# Patient Record
Sex: FEMALE | Employment: OTHER | URBAN - METROPOLITAN AREA
[De-identification: names, ages, dates, MRNs, and addresses within clinical notes are randomized per-mention and may not be internally consistent; named-entity substitution may affect disease eponyms.]

---

## 2021-07-22 ENCOUNTER — HOSPITAL ENCOUNTER (OUTPATIENT)
Age: 66
Setting detail: SPECIMEN
Discharge: HOME | End: 2021-07-22
Payer: MEDICARE

## 2021-07-22 PROCEDURE — U0003 INFECTIOUS AGENT DETECTION BY NUCLEIC ACID (DNA OR RNA); SEVERE ACUTE RESPIRATORY SYNDROME CORONAVIRUS 2 (SARS-COV-2) (CORONAVIRUS DISEASE [COVID-19]), AMPLIFIED PROBE TECHNIQUE, MAKING USE OF HIGH THROUGHPUT TECHNOLOGIES AS DESCRIBED BY CMS-2020-01-R: HCPCS

## 2021-07-22 PROCEDURE — U0005 INFEC AGEN DETEC AMPLI PROBE: HCPCS

## 2021-07-23 LAB
SARS-COV-2: NOT DETECTED
SOURCE: NORMAL

## 2022-03-28 ENCOUNTER — TELEPHONE (OUTPATIENT)
Dept: CARDIOLOGY CLINIC | Age: 67
End: 2022-03-28

## 2022-03-28 NOTE — TELEPHONE ENCOUNTER
Left message for patient requesting a return call to schedule a consult for peripheral vascular disease and essential hypertension per referral from Hattie Macedo.

## 2022-05-25 ENCOUNTER — HOSPITAL ENCOUNTER (OUTPATIENT)
Dept: CT IMAGING | Age: 67
Discharge: HOME | End: 2022-05-25
Payer: MEDICARE

## 2022-05-25 ENCOUNTER — INITIAL CONSULT (OUTPATIENT)
Dept: CARDIOLOGY CLINIC | Age: 67
End: 2022-05-25
Payer: MEDICARE

## 2022-05-25 VITALS
WEIGHT: 272 LBS | HEART RATE: 69 BPM | HEIGHT: 72 IN | DIASTOLIC BLOOD PRESSURE: 80 MMHG | BODY MASS INDEX: 36.84 KG/M2 | SYSTOLIC BLOOD PRESSURE: 110 MMHG

## 2022-05-25 DIAGNOSIS — E78.5 HYPERLIPIDEMIA, UNSPECIFIED HYPERLIPIDEMIA TYPE: ICD-10-CM

## 2022-05-25 DIAGNOSIS — I10 HYPERTENSION, UNSPECIFIED TYPE: Primary | ICD-10-CM

## 2022-05-25 DIAGNOSIS — R06.02 SOB (SHORTNESS OF BREATH): ICD-10-CM

## 2022-05-25 DIAGNOSIS — I73.9 CLAUDICATION (HCC): ICD-10-CM

## 2022-05-25 DIAGNOSIS — I10 HYPERTENSION, UNSPECIFIED TYPE: ICD-10-CM

## 2022-05-25 LAB
GFR AFRICAN AMERICAN: 54 ML/MIN/1.73M2
GFR NON-AFRICAN AMERICAN: 45 ML/MIN/1.73M2
POC CREATININE: 1.2 MG/DL (ref 0.6–1.1)

## 2022-05-25 PROCEDURE — 75635 CT ANGIO ABDOMINAL ARTERIES: CPT

## 2022-05-25 PROCEDURE — 1036F TOBACCO NON-USER: CPT | Performed by: INTERNAL MEDICINE

## 2022-05-25 PROCEDURE — 2580000003 HC RX 258: Performed by: INTERNAL MEDICINE

## 2022-05-25 PROCEDURE — 93000 ELECTROCARDIOGRAM COMPLETE: CPT | Performed by: INTERNAL MEDICINE

## 2022-05-25 PROCEDURE — 3017F COLORECTAL CA SCREEN DOC REV: CPT | Performed by: INTERNAL MEDICINE

## 2022-05-25 PROCEDURE — 1090F PRES/ABSN URINE INCON ASSESS: CPT | Performed by: INTERNAL MEDICINE

## 2022-05-25 PROCEDURE — 1123F ACP DISCUSS/DSCN MKR DOCD: CPT | Performed by: INTERNAL MEDICINE

## 2022-05-25 PROCEDURE — G8400 PT W/DXA NO RESULTS DOC: HCPCS | Performed by: INTERNAL MEDICINE

## 2022-05-25 PROCEDURE — G8417 CALC BMI ABV UP PARAM F/U: HCPCS | Performed by: INTERNAL MEDICINE

## 2022-05-25 PROCEDURE — 99204 OFFICE O/P NEW MOD 45 MIN: CPT | Performed by: INTERNAL MEDICINE

## 2022-05-25 PROCEDURE — 6360000004 HC RX CONTRAST MEDICATION: Performed by: INTERNAL MEDICINE

## 2022-05-25 PROCEDURE — G8427 DOCREV CUR MEDS BY ELIG CLIN: HCPCS | Performed by: INTERNAL MEDICINE

## 2022-05-25 RX ORDER — LEVOTHYROXINE SODIUM 0.05 MG/1
50 TABLET ORAL DAILY
COMMUNITY
Start: 2022-03-22

## 2022-05-25 RX ORDER — DICYCLOMINE HCL 20 MG
20 TABLET ORAL EVERY 6 HOURS
COMMUNITY
Start: 2022-03-22

## 2022-05-25 RX ORDER — LISINOPRIL 10 MG/1
10 TABLET ORAL DAILY
COMMUNITY
Start: 2022-03-22

## 2022-05-25 RX ORDER — ALBUTEROL SULFATE 90 UG/1
1 AEROSOL, METERED RESPIRATORY (INHALATION) EVERY 6 HOURS PRN
COMMUNITY

## 2022-05-25 RX ORDER — ROSUVASTATIN CALCIUM 40 MG/1
40 TABLET, COATED ORAL DAILY
COMMUNITY
Start: 2022-03-22

## 2022-05-25 RX ORDER — CLOPIDOGREL BISULFATE 75 MG/1
75 TABLET ORAL DAILY
Qty: 90 TABLET | Refills: 3 | Status: SHIPPED | OUTPATIENT
Start: 2022-05-25

## 2022-05-25 RX ORDER — SODIUM CHLORIDE 0.9 % (FLUSH) 0.9 %
5-40 SYRINGE (ML) INJECTION PRN
Status: DISCONTINUED | OUTPATIENT
Start: 2022-05-25 | End: 2022-05-26 | Stop reason: HOSPADM

## 2022-05-25 RX ORDER — LORAZEPAM 1 MG/1
0.5 TABLET ORAL PRN
COMMUNITY
Start: 2022-05-05

## 2022-05-25 RX ORDER — OXYCODONE AND ACETAMINOPHEN 7.5; 325 MG/1; MG/1
7.5 TABLET ORAL EVERY 8 HOURS PRN
COMMUNITY
Start: 2022-04-29

## 2022-05-25 RX ORDER — CLOPIDOGREL BISULFATE 75 MG/1
75 TABLET ORAL DAILY
COMMUNITY
Start: 2022-03-22 | End: 2022-05-25 | Stop reason: SDUPTHER

## 2022-05-25 RX ADMIN — IOPAMIDOL 95 ML: 755 INJECTION, SOLUTION INTRAVENOUS at 15:40

## 2022-05-25 RX ADMIN — SODIUM CHLORIDE, PRESERVATIVE FREE 10 ML: 5 INJECTION INTRAVENOUS at 15:40

## 2022-05-25 NOTE — PROGRESS NOTES
(123.4 kg)   Height: 6' 1\" (1.854 m)     /80   Pulse 69   Ht 6' 1\" (1.854 m)   Wt 272 lb (123.4 kg)   BMI 35.89 kg/m²     No flowsheet data found. Wt Readings from Last 3 Encounters:   05/25/22 272 lb (123.4 kg)     Body mass index is 35.89 kg/m². GENERAL - Alert, oriented, pleasant, in no apparent distress. EYES: No jaundice, no conjunctival pallor. SKIN: It is warm & dry. No rashes. No Echhymosis    HEENT  No clinically significant abnormalities seen. Neck - Supple. No jugular venous distention noted. No carotid bruits. Cardiovascular  Normal S1 and S2 without obvious murmur or gallop. Extremities - No cyanosis, clubbing, or significant edema. Pulmonary  No respiratory distress. No wheezes or rales. Abdomen  No masses, tenderness, or organomegaly. Musculoskeletal  No significant edema. No joint deformities. No muscle wasting. Neurologic  Cranial nerves II through XII are grossly intact. There were no gross focal neurologic abnormalities. Lab Review   No results found for: CKTOTAL, CKMB, CKMBINDEX, TROPONINT  BNP:  No results found for: BNP  PT/INR:  No results found for: INR  No results found for: LABA1C  No results found for: WBC, HCT, MCV, PLT  No results found for: CHOL, TRIG, HDL, LDLCALC, LDLDIRECT, CHOLHDLRATIO  No results found for: ALT, AST  BMP:  No results found for: NA, K, CL, CO2, BUN, CREATININE, GLU  CMP: No results found for: NA, K, CL, CO2, BUN, GLU, PROT, ALB, CA  TSH:  No results found for: TSH, TSHHS        Assessment & Plan:    -  Hypertension: Patients blood pressure is normal. Patient is advised about low sodium diet. Present medical regimen will not be changed. On lisinopril blood pressure is well controlled we will continue          -  LIPID MANAGEMENT:  Importance of lipid levels discussed with patient   and patient was given dietary advice. NCEP- ATP III guidelines reviewed with patient.     -   Changes  in medicines made: No     On Crestor 40 mg p.o. daily we will check the lipid profile    -On Synthroid for hypothyroidism on 50 mcg p.o. daily                    -Peripheral vascular disease  . Monophasic waveforms bilaterally throughout the lower extremities,   suggesting inflow disease (aorta-iliac disease). 2. Segment distal superficial femoral artery on the right with approximately   50-70 percent stenosis. ABIs done 3/27/19 that show a right JORDAN of 0.78 and a left JORDAN of 0.82. CTA abd and runoff    Check echo for EF      - DVT: in past in LLE ? Etiology    Mortality from the morbid obesity is very high: Body mass index is 35.89 kg/m². Meir Hayes MD    [facility] [place]    Please note this report has been partially produced using speech recognition software and may contain errors related to that system including errors in grammar, punctuation, and spelling, as well as words and phrases that may be inappropriate. If there are any questions or concerns please feel free to contact the dictating provider for clarification.

## 2022-05-26 ENCOUNTER — PROCEDURE VISIT (OUTPATIENT)
Dept: CARDIOLOGY CLINIC | Age: 67
End: 2022-05-26
Payer: MEDICARE

## 2022-05-26 ENCOUNTER — TELEPHONE (OUTPATIENT)
Dept: CARDIOLOGY CLINIC | Age: 67
End: 2022-05-26

## 2022-05-26 DIAGNOSIS — I10 HYPERTENSION, UNSPECIFIED TYPE: ICD-10-CM

## 2022-05-26 DIAGNOSIS — I73.9 CLAUDICATION (HCC): ICD-10-CM

## 2022-05-26 DIAGNOSIS — R06.02 SOB (SHORTNESS OF BREATH): Primary | ICD-10-CM

## 2022-05-26 DIAGNOSIS — E78.5 HYPERLIPIDEMIA, UNSPECIFIED HYPERLIPIDEMIA TYPE: ICD-10-CM

## 2022-05-26 LAB
LV EF: 58 %
LVEF MODALITY: NORMAL

## 2022-05-26 PROCEDURE — 93306 TTE W/DOPPLER COMPLETE: CPT | Performed by: INTERNAL MEDICINE

## 2022-05-26 NOTE — TELEPHONE ENCOUNTER
Left message on patient's voicemail to call office to discuss date and time for peripheral angiogram..

## 2022-05-26 NOTE — TELEPHONE ENCOUNTER
I spoke with patient and scheduled peripheral angiogram for 5/31/22 @ 8:00 with arrival time of 6:00.   She will come for paperwpork pm [phone] @ 11:00

## 2022-05-27 ENCOUNTER — HOSPITAL ENCOUNTER (OUTPATIENT)
Age: 67
Discharge: HOME | End: 2022-05-27
Payer: MEDICARE

## 2022-05-27 ENCOUNTER — NURSE ONLY (OUTPATIENT)
Dept: CARDIOLOGY CLINIC | Age: 67
End: 2022-05-27
Payer: MEDICARE

## 2022-05-27 DIAGNOSIS — Z01.810 PRE-OPERATIVE CARDIOVASCULAR EXAMINATION: ICD-10-CM

## 2022-05-27 LAB
ANION GAP SERPL CALCULATED.3IONS-SCNC: 13 MMOL/L (ref 4–16)
BASOPHILS ABSOLUTE: 0 K/CU MM
BASOPHILS RELATIVE PERCENT: 0.4 % (ref 0–1)
BUN BLDV-MCNC: 12 MG/DL (ref 6–23)
CALCIUM SERPL-MCNC: 9.3 MG/DL (ref 8.3–10.6)
CHLORIDE BLD-SCNC: 106 MMOL/L (ref 99–110)
CO2: 23 MMOL/L (ref 21–32)
CREAT SERPL-MCNC: 0.8 MG/DL (ref 0.6–1.1)
DIFFERENTIAL TYPE: ABNORMAL
EOSINOPHILS ABSOLUTE: 0.2 K/CU MM
EOSINOPHILS RELATIVE PERCENT: 3.6 % (ref 0–3)
GFR AFRICAN AMERICAN: >60 ML/MIN/1.73M2
GFR NON-AFRICAN AMERICAN: >60 ML/MIN/1.73M2
GLUCOSE BLD-MCNC: 99 MG/DL (ref 70–99)
HCT VFR BLD CALC: 43 % (ref 37–47)
HEMOGLOBIN: 13.3 GM/DL (ref 12.5–16)
IMMATURE NEUTROPHIL %: 0.2 % (ref 0–0.43)
LYMPHOCYTES ABSOLUTE: 1.3 K/CU MM
LYMPHOCYTES RELATIVE PERCENT: 23.9 % (ref 24–44)
MCH RBC QN AUTO: 29 PG (ref 27–31)
MCHC RBC AUTO-ENTMCNC: 30.9 % (ref 32–36)
MCV RBC AUTO: 93.9 FL (ref 78–100)
MONOCYTES ABSOLUTE: 0.5 K/CU MM
MONOCYTES RELATIVE PERCENT: 8.8 % (ref 0–4)
NUCLEATED RBC %: 0 %
PDW BLD-RTO: 13.5 % (ref 11.7–14.9)
PLATELET # BLD: 253 K/CU MM (ref 140–440)
PMV BLD AUTO: 9.3 FL (ref 7.5–11.1)
POTASSIUM SERPL-SCNC: 4.8 MMOL/L (ref 3.5–5.1)
RBC # BLD: 4.58 M/CU MM (ref 4.2–5.4)
SEGMENTED NEUTROPHILS ABSOLUTE COUNT: 3.4 K/CU MM
SEGMENTED NEUTROPHILS RELATIVE PERCENT: 63.1 % (ref 36–66)
SODIUM BLD-SCNC: 142 MMOL/L (ref 135–145)
TOTAL IMMATURE NEUTOROPHIL: 0.01 K/CU MM
TOTAL NUCLEATED RBC: 0 K/CU MM
WBC # BLD: 5.3 K/CU MM (ref 4–10.5)

## 2022-05-27 PROCEDURE — 80048 BASIC METABOLIC PNL TOTAL CA: CPT

## 2022-05-27 PROCEDURE — 36415 COLL VENOUS BLD VENIPUNCTURE: CPT

## 2022-05-27 PROCEDURE — 99211 OFF/OP EST MAY X REQ PHY/QHP: CPT | Performed by: INTERNAL MEDICINE

## 2022-05-27 PROCEDURE — 85025 COMPLETE CBC W/AUTO DIFF WBC: CPT

## 2022-05-27 NOTE — PROGRESS NOTES
Patient was here in office & educated on Peripheral Angiogram for Dx: PAD. Procedure is scheduled for 5/31/22  @ 8:00, w/arrival @ 6:00, @ [facility]. Pre-admission orders were given to patient for labs & CXR, which are due 5/27/22 @ [facility]. Procedure and risks were explained to patient. Consent forms were signed. Instructions were given to patient to remain NPO after midnight the night before procedure. Patient may take morning meds the morning of procedure with small amount of water. Patient is asked to call hospital @ [phone], 1 to 2 days before procedure to pre-register. Patient was notified that procedure could be delayed due to an emergency. Patient voiced understanding.  Copies of consent, pre-testing orders, & information sheet scanned into media

## 2022-05-31 ENCOUNTER — HOSPITAL ENCOUNTER (OUTPATIENT)
Dept: CARDIAC CATH/INVASIVE PROCEDURES | Age: 67
Discharge: HOME | End: 2022-05-31
Attending: INTERNAL MEDICINE | Admitting: INTERNAL MEDICINE
Payer: MEDICARE

## 2022-05-31 VITALS
WEIGHT: 274 LBS | DIASTOLIC BLOOD PRESSURE: 60 MMHG | TEMPERATURE: 95.2 F | HEART RATE: 71 BPM | BODY MASS INDEX: 37.11 KG/M2 | RESPIRATION RATE: 14 BRPM | HEIGHT: 72 IN | SYSTOLIC BLOOD PRESSURE: 131 MMHG | OXYGEN SATURATION: 97 %

## 2022-05-31 PROCEDURE — 2580000003 HC RX 258: Performed by: INTERNAL MEDICINE

## 2022-05-31 PROCEDURE — 6370000000 HC RX 637 (ALT 250 FOR IP): Performed by: INTERNAL MEDICINE

## 2022-05-31 PROCEDURE — 6360000004 HC RX CONTRAST MEDICATION

## 2022-05-31 PROCEDURE — 75716 ARTERY X-RAYS ARMS/LEGS: CPT | Performed by: INTERNAL MEDICINE

## 2022-05-31 PROCEDURE — 75630 X-RAY AORTA LEG ARTERIES: CPT

## 2022-05-31 PROCEDURE — 2709999900 HC NON-CHARGEABLE SUPPLY

## 2022-05-31 PROCEDURE — C1894 INTRO/SHEATH, NON-LASER: HCPCS

## 2022-05-31 PROCEDURE — 36200 PLACE CATHETER IN AORTA: CPT | Performed by: INTERNAL MEDICINE

## 2022-05-31 PROCEDURE — 36200 PLACE CATHETER IN AORTA: CPT

## 2022-05-31 PROCEDURE — 75625 CONTRAST EXAM ABDOMINL AORTA: CPT | Performed by: INTERNAL MEDICINE

## 2022-05-31 PROCEDURE — 6360000002 HC RX W HCPCS

## 2022-05-31 RX ORDER — IBUPROFEN 600 MG/1
600 TABLET ORAL EVERY 6 HOURS PRN
COMMUNITY

## 2022-05-31 RX ORDER — CILOSTAZOL 50 MG/1
50 TABLET ORAL 2 TIMES DAILY
Qty: 60 TABLET | Refills: 3 | Status: SHIPPED | OUTPATIENT
Start: 2022-05-31 | End: 2022-06-13 | Stop reason: DRUGHIGH

## 2022-05-31 RX ORDER — SODIUM CHLORIDE 9 MG/ML
INJECTION, SOLUTION INTRAVENOUS PRN
Status: DISCONTINUED | OUTPATIENT
Start: 2022-05-31 | End: 2022-05-31 | Stop reason: HOSPADM

## 2022-05-31 RX ORDER — CETIRIZINE HYDROCHLORIDE 10 MG/1
10 TABLET ORAL DAILY
COMMUNITY

## 2022-05-31 RX ORDER — DIAZEPAM 5 MG/1
5 TABLET ORAL ONCE
Status: COMPLETED | OUTPATIENT
Start: 2022-05-31 | End: 2022-05-31

## 2022-05-31 RX ORDER — DIPHENHYDRAMINE HCL 25 MG
25 TABLET ORAL ONCE
Status: COMPLETED | OUTPATIENT
Start: 2022-05-31 | End: 2022-05-31

## 2022-05-31 RX ORDER — SODIUM CHLORIDE 0.9 % (FLUSH) 0.9 %
5-40 SYRINGE (ML) INJECTION EVERY 12 HOURS SCHEDULED
Status: DISCONTINUED | OUTPATIENT
Start: 2022-05-31 | End: 2022-05-31 | Stop reason: HOSPADM

## 2022-05-31 RX ORDER — SODIUM CHLORIDE 9 MG/ML
INJECTION, SOLUTION INTRAVENOUS CONTINUOUS
Status: DISCONTINUED | OUTPATIENT
Start: 2022-05-31 | End: 2022-05-31 | Stop reason: HOSPADM

## 2022-05-31 RX ORDER — SODIUM CHLORIDE 0.9 % (FLUSH) 0.9 %
5-40 SYRINGE (ML) INJECTION PRN
Status: DISCONTINUED | OUTPATIENT
Start: 2022-05-31 | End: 2022-05-31 | Stop reason: HOSPADM

## 2022-05-31 RX ORDER — ACETAMINOPHEN 325 MG/1
650 TABLET ORAL EVERY 4 HOURS PRN
Status: DISCONTINUED | OUTPATIENT
Start: 2022-05-31 | End: 2022-05-31 | Stop reason: HOSPADM

## 2022-05-31 RX ADMIN — DIPHENHYDRAMINE HCL 25 MG: 25 TABLET ORAL at 07:23

## 2022-05-31 RX ADMIN — SODIUM CHLORIDE: 9 INJECTION, SOLUTION INTRAVENOUS at 07:23

## 2022-05-31 RX ADMIN — DIAZEPAM 5 MG: 5 TABLET ORAL at 07:23

## 2022-05-31 NOTE — PLAN OF CARE
Sat patient up 30 degrees at this time to prepare for discharge. Patient denies needs and left groin assessed with no bleeding or hematoma noted. Called patients pharmacy in [place] in regards to obtaining the pletal prescription any cheaper due to patient utilizing the RX card. Hospital faxed the prescription to [facility] pharmacy to fill there and save money. All discharge instructions were explained to the patient and  while at bedside and patient denies any additional information. Patient walked to the bathroom without difficulty and IV removed per orders.   [address] 5/31/2022

## 2022-05-31 NOTE — H&P
CHIEF COMPLAINT   Melquiades Boyd is a 78 y.o. female who was seen today for management of peripheral vascular disease                                    HPI:                    Pt has h/o peripheral vascular disease, had PTA of RLE, in past, DVT LLE , hypertension , hyperlipidemia seen today for  joby LE pain and numbness, RLE more painful. Pt saw MD at [address] [place] [address] has the following history recorded in care path: There are no problems to display for this patient.     Current Facility-Administered Medications          Current Outpatient Medications   Medication Sig Dispense Refill    oxyCODONE-acetaminophen (PERCOCET) 7.5-325 MG per tablet Take 7.5 tablets by mouth every 8 hours as needed.        LORazepam (ATIVAN) 1 MG tablet Take 0.5 mg by mouth as needed.        albuterol sulfate  (90 Base) MCG/ACT inhaler Inhale 1 puff into the lungs every 6 hours as needed        rosuvastatin (CRESTOR) 40 MG tablet Take 40 mg by mouth daily        lisinopril (PRINIVIL;ZESTRIL) 10 MG tablet Take 10 mg by mouth daily        clopidogrel (PLAVIX) 75 MG tablet Take 75 mg by mouth daily        levothyroxine (SYNTHROID) 50 MCG tablet Take 50 mcg by mouth daily        dicyclomine (BENTYL) 20 MG tablet Take 20 mg by mouth every 6 hours          No current facility-administered medications for this visit.         Allergies: Patient has no known allergies. Past Medical History   History reviewed. No pertinent past medical history. Past Surgical History   History reviewed. No pertinent surgical history.       As reviewed   Family History   Family History   Problem Relation Age of Onset    Heart Attack Mother           Social History            Tobacco Use    Smoking status: Former Smoker       Types: Cigarettes    Smokeless tobacco: Never Used   Substance Use Topics    Alcohol use: Not Currently         Objective:     Vitals       Vitals:     05/25/22 1428   BP: 110/80   Pulse: 69   Weight: 272 lb (123.4 kg) Height: 6' 1\" (1.854 m)         /80   Pulse 69   Ht 6' 1\" (1.854 m)   Wt 272 lb (123.4 kg)   BMI 35.89 kg/m²      No flowsheet data found.          Wt Readings from Last 3 Encounters:   05/25/22 272 lb (123.4 kg)      Body mass index is 35.89 kg/m². GENERAL - Alert, oriented, pleasant, in no apparent distress. EYES: No jaundice, no conjunctival pallor. SKIN: It is warm & dry. No rashes. No Echhymosis    HEENT  No clinically significant abnormalities seen. Neck - Supple. No jugular venous distention noted. No carotid bruits. Cardiovascular  Normal S1 and S2 without obvious murmur or gallop. Extremities - No cyanosis, clubbing, or significant edema. Pulmonary  No respiratory distress. No wheezes or rales. Abdomen  No masses, tenderness, or organomegaly. Musculoskeletal  No significant edema. No joint deformities. No muscle wasting. Neurologic  Cranial nerves II through XII are grossly intact. There were no gross focal neurologic abnormalities.     Lab Review   No results found for: CKTOTAL, CKMB, CKMBINDEX, TROPONINT  BNP:  No results found for: BNP  PT/INR:  No results found for: INR  No results found for: LABA1C  No results found for: WBC, HCT, MCV, PLT  No results found for: CHOL, TRIG, HDL, LDLCALC, LDLDIRECT, CHOLHDLRATIO  No results found for: ALT, AST  BMP:  No results found for: NA, K, CL, CO2, BUN, CREATININE, GLU  CMP: No results found for: NA, K, CL, CO2, BUN, GLU, PROT, ALB, CA  TSH:  No results found for: TSH, TSHHS           Assessment & Plan:     -  Hypertension: Patients blood pressure is normal. Patient is advised about low sodium diet. Present medical regimen will not be changed.          On lisinopril blood pressure is well controlled we will continue           -  LIPID MANAGEMENT:  Importance of lipid levels discussed with patient   and patient was given dietary advice. NCEP- ATP III guidelines reviewed with patient.     -   Changes  in medicines made: No     On Crestor 40 mg p.o. daily we will check the lipid profile     -On Synthroid for hypothyroidism on 50 mcg p.o. daily                    -Peripheral vascular disease  . Monophasic waveforms bilaterally throughout the lower extremities,   suggesting inflow disease (aorta-iliac disease). 2. Segment distal superficial femoral artery on the right with approximately   50-70 percent stenosis.      ABIs done 3/27/19 that show a right JORDAN of 0.78 and a left JORDAN of 0.82.      CTA abd and runoff     Check echo for EF        - DVT: in past in LLE ?  Etiology     Mortality from the morbid obesity is very high:     Body mass index is 35.89 kg/m

## 2022-06-01 ENCOUNTER — TELEPHONE (OUTPATIENT)
Dept: CARDIOLOGY CLINIC | Age: 67
End: 2022-06-01

## 2022-06-01 DIAGNOSIS — R06.02 SOB (SHORTNESS OF BREATH): Primary | ICD-10-CM

## 2022-06-01 DIAGNOSIS — I73.9 CLAUDICATION (HCC): ICD-10-CM

## 2022-06-01 NOTE — TELEPHONE ENCOUNTER
Attempted to call patient no answer. Left message advised her that we need to schedule lexiscan per . Ask for [place] Filter when returning call.      Schedule for pre EVAR  lexiscan   High risk for CAD

## 2022-06-02 NOTE — OP NOTE
Procedure  Abdominal aortogram  Distal runoff  Hemodynamic assessment of the abdominal aorta    Procedure  After CONSENT PATIENT WAS BROUGHT TO THE CATH LAB THE LEFT GROIN WAS INJECTED 2% LIDOCAINE A 4 Khmer SHEATH WAS PLACED IN THE LEFT FEMORAL ARTERY WAS TAKING UP OBESITY DIFFICULT WAS PLACED IN THE DESCENDING AORTA AND ABDOMINAL REBOUND PERFORMED AND THE CATH WAS PULLED BACK ABOVE THE BIFURCATION AND DISTAL RUNOFF PERFORMED PATIENT CLINICALLY DOING STABLE    FINDINGS    The abdominal aorta is atherosclerotic its aneurysmal in the middle there appears to be a stenosis infrarenally and a 20 mm gradient was noticed    Right common iliac artery has ostial stenosis which is about 70%  The right external iliac artery and right common femoral artery is patent  The right SFA has some diffuse disease distally there is a stent which is just approaching the popliteal and the appropriate there appears to be 50% stenosis and infrapopliteal there is three-vessel runoff without any significant stenosis    Left common iliac arteries patent left external leg arteries patent left common femoral artery is patent the left SFA is patent the left popliteal has about 80 to 90% stenosis in its mid segment infrapopliteal there is three-vessel runoff    Assessment and plan  Patient is severe PVD as mentioned above the abdominal aorta is atherosclerotic and appears to be aneurysmal hence we will obtain a dedicated CTA abdomen to evaluate the size of the aneurysm and as mentioned there is a stenosis in the mid segment of the aorta i.e. abdominal aorta  The right common iliac artery has ostial stenosis which is appears to be significant about 80%  The right SFA distal to the stent has about 50 to 60% stenosis  On the left side the left popliteal artery has critical stenosis    After we obtain the CT of the abdomen and assess the size of the aneurysm which will decide whether the patient will need EVAR for management  The distal SFA and the popliteal on the left side can be approached daily if needed however first we need to assess the aneurysmal size the CT has been arranged for next week

## 2022-06-03 ENCOUNTER — PROCEDURE VISIT (OUTPATIENT)
Dept: CARDIOLOGY CLINIC | Age: 67
End: 2022-06-03
Payer: MEDICARE

## 2022-06-03 DIAGNOSIS — R06.02 SOB (SHORTNESS OF BREATH): ICD-10-CM

## 2022-06-03 DIAGNOSIS — I73.9 CLAUDICATION (HCC): ICD-10-CM

## 2022-06-03 LAB
LV EF: 60 %
LVEF MODALITY: NORMAL

## 2022-06-03 PROCEDURE — 93018 CV STRESS TEST I&R ONLY: CPT | Performed by: INTERNAL MEDICINE

## 2022-06-03 PROCEDURE — 78452 HT MUSCLE IMAGE SPECT MULT: CPT | Performed by: INTERNAL MEDICINE

## 2022-06-03 PROCEDURE — A9500 TC99M SESTAMIBI: HCPCS | Performed by: INTERNAL MEDICINE

## 2022-06-03 PROCEDURE — 93016 CV STRESS TEST SUPVJ ONLY: CPT | Performed by: INTERNAL MEDICINE

## 2022-06-03 PROCEDURE — 93017 CV STRESS TEST TRACING ONLY: CPT | Performed by: INTERNAL MEDICINE

## 2022-06-07 ENCOUNTER — HOSPITAL ENCOUNTER (OUTPATIENT)
Dept: CT IMAGING | Age: 67
Discharge: HOME | End: 2022-06-07
Payer: MEDICARE

## 2022-06-07 DIAGNOSIS — I71.40 ABDOMINAL AORTIC ANEURYSM (AAA) WITHOUT RUPTURE: ICD-10-CM

## 2022-06-07 PROCEDURE — 74174 CTA ABD&PLVS W/CONTRAST: CPT

## 2022-06-07 PROCEDURE — 6360000004 HC RX CONTRAST MEDICATION: Performed by: INTERNAL MEDICINE

## 2022-06-07 RX ADMIN — IOPAMIDOL 75 ML: 755 INJECTION, SOLUTION INTRAVENOUS at 14:10

## 2022-06-13 ENCOUNTER — OFFICE VISIT (OUTPATIENT)
Dept: CARDIOLOGY CLINIC | Age: 67
End: 2022-06-13
Payer: MEDICARE

## 2022-06-13 VITALS
BODY MASS INDEX: 36.35 KG/M2 | DIASTOLIC BLOOD PRESSURE: 72 MMHG | HEART RATE: 80 BPM | HEIGHT: 72 IN | SYSTOLIC BLOOD PRESSURE: 118 MMHG | WEIGHT: 268.4 LBS

## 2022-06-13 DIAGNOSIS — I73.9 CLAUDICATION (HCC): Primary | ICD-10-CM

## 2022-06-13 PROCEDURE — G8427 DOCREV CUR MEDS BY ELIG CLIN: HCPCS | Performed by: INTERNAL MEDICINE

## 2022-06-13 PROCEDURE — 1036F TOBACCO NON-USER: CPT | Performed by: INTERNAL MEDICINE

## 2022-06-13 PROCEDURE — G8417 CALC BMI ABV UP PARAM F/U: HCPCS | Performed by: INTERNAL MEDICINE

## 2022-06-13 PROCEDURE — 1090F PRES/ABSN URINE INCON ASSESS: CPT | Performed by: INTERNAL MEDICINE

## 2022-06-13 PROCEDURE — 1123F ACP DISCUSS/DSCN MKR DOCD: CPT | Performed by: INTERNAL MEDICINE

## 2022-06-13 PROCEDURE — G8400 PT W/DXA NO RESULTS DOC: HCPCS | Performed by: INTERNAL MEDICINE

## 2022-06-13 PROCEDURE — 99214 OFFICE O/P EST MOD 30 MIN: CPT | Performed by: INTERNAL MEDICINE

## 2022-06-13 PROCEDURE — 3017F COLORECTAL CA SCREEN DOC REV: CPT | Performed by: INTERNAL MEDICINE

## 2022-06-13 RX ORDER — CILOSTAZOL 50 MG/1
100 TABLET ORAL 2 TIMES DAILY
Qty: 60 TABLET | Refills: 3 | Status: SHIPPED
Start: 2022-06-13 | End: 2022-09-20

## 2022-06-13 RX ORDER — BUSPIRONE HYDROCHLORIDE 10 MG/1
TABLET ORAL
COMMUNITY
Start: 2022-06-08

## 2022-06-13 NOTE — PROGRESS NOTES
CARDIOLOGY NOTE      6/13/2022    RE: Foreign Donnelly  (1955)                               TO:  Dr. Juan Pablo Mendoza, APRN - CNP      New patient  Thanks for the referral      CHIEF Tata Ordonez is a 78 y.o. female who was seen today for management of peripheral vascular disease                                    HPI:                   Pt has h/o peripheral vascular disease, had PTA of RLE, in past, DVT LLE , hypertension , hyperlipidemia seen today for follow-up on CTA of abdomen and lower extremities    Foreign Donnelly has the following history recorded in care path:  Patient Active Problem List    Diagnosis Date Noted    [facility])      Current Outpatient Medications   Medication Sig Dispense Refill    busPIRone (BUSPAR) 10 MG tablet Take 1 tablet by mouth 3 times daily as needed for Anxiety.  cilostazol (PLETAL) 50 MG tablet Take 2 tablets by mouth 2 times daily 60 tablet 3    ibuprofen (ADVIL;MOTRIN) 600 MG tablet Take 600 mg by mouth every 6 hours as needed for Pain      cetirizine (ZYRTEC) 10 MG tablet Take 10 mg by mouth daily      oxyCODONE-acetaminophen (PERCOCET) 7.5-325 MG per tablet Take 7.5 tablets by mouth every 8 hours as needed.  LORazepam (ATIVAN) 1 MG tablet Take 0.5 mg by mouth as needed.  albuterol sulfate  (90 Base) MCG/ACT inhaler Inhale 1 puff into the lungs every 6 hours as needed      rosuvastatin (CRESTOR) 40 MG tablet Take 40 mg by mouth daily      lisinopril (PRINIVIL;ZESTRIL) 10 MG tablet Take 10 mg by mouth daily      levothyroxine (SYNTHROID) 50 MCG tablet Take 50 mcg by mouth daily      dicyclomine (BENTYL) 20 MG tablet Take 20 mg by mouth every 6 hours      clopidogrel (PLAVIX) 75 MG tablet Take 1 tablet by mouth daily 90 tablet 3     No current facility-administered medications for this visit.      Allergies: Tramadol  Past Medical History:   Diagnosis Date    Abnormal aortogram 05/30/2022    Severe PAD, See notes for detailed report    Hyperlipidemia     Hypertension     PVD (peripheral vascular disease) ([facility] [address]      Past Surgical History:   Procedure Laterality Date    HIP SURGERY Bilateral     HYSTERECTOMY (CERVIX STATUS UNKNOWN)      KNEE SURGERY Bilateral     WRIST SURGERY Left       As reviewed   Family History   Problem Relation Age of Onset    Heart Attack Mother      Social History     Tobacco Use    Smoking status: Former Smoker     Types: Cigarettes    Smokeless tobacco: Never Used   Substance Use Topics    Alcohol use: Not Currently        Objective:    Vitals:    06/13/22 1446   BP: 118/72   Pulse: 80   Weight: 268 lb 6.4 oz (121.7 kg)   Height: 6' 1\" (1.854 m)     /72   Pulse 80   Ht 6' 1\" (1.854 m)   Wt 268 lb 6.4 oz (121.7 kg)   BMI 35.41 kg/m²     No flowsheet data found. Wt Readings from Last 3 Encounters:   06/13/22 268 lb 6.4 oz (121.7 kg)   05/31/22 274 lb (124.3 kg)   05/25/22 272 lb (123.4 kg)     Body mass index is 35.41 kg/m². GENERAL - Alert, oriented, pleasant, in no apparent distress. EYES: No jaundice, no conjunctival pallor. SKIN: It is warm & dry. No rashes. No Echhymosis    HEENT - No clinically significant abnormalities seen. Neck - Supple. No jugular venous distention noted. No carotid bruits. Cardiovascular - Normal S1 and S2 without obvious murmur or gallop. Extremities - No cyanosis, clubbing, or significant edema. Pulmonary - No respiratory distress. No wheezes or rales. Abdomen - No masses, tenderness, or organomegaly. Musculoskeletal - No significant edema. No joint deformities. No muscle wasting. Neurologic - Cranial nerves II through XII are grossly intact. There were no gross focal neurologic abnormalities.     Lab Review   No results found for: CKTOTAL, CKMB, CKMBINDEX, TROPONINT  BNP:  No results found for: BNP  PT/INR:  No results found for: INR  No results found for: LABA1C  Lab Results   Component Value Date    WBC 5.3 05/27/2022    HCT 43.0 05/27/2022    MCV 93.9 05/27/2022     05/27/2022     No results found for: CHOL, TRIG, HDL, LDLCALC, LDLDIRECT, CHOLHDLRATIO  No results found for: ALT, AST  BMP:    Lab Results   Component Value Date     05/27/2022    K 4.8 05/27/2022     05/27/2022    CO2 23 05/27/2022    BUN 12 05/27/2022    CREATININE 0.8 05/27/2022     CMP:   Lab Results   Component Value Date     05/27/2022    K 4.8 05/27/2022     05/27/2022    CO2 23 05/27/2022    BUN 12 05/27/2022     TSH:  No results found for: TSH, TSHHS        Assessment & Plan:    -  Hypertension: Patients blood pressure is normal. Patient is advised about low sodium diet. Present medical regimen will not be changed. On lisinopril blood pressure is well controlled we will continue          -  LIPID MANAGEMENT:  Importance of lipid levels discussed with patient   and patient was given dietary advice. NCEP- ATP III guidelines reviewed with patient. -   Changes  in medicines made: No     On Crestor 40 mg p.o. daily we will check the lipid profile    -On Synthroid for hypothyroidism on 50 mcg p.o. daily                    -Peripheral vascular disease  . Monophasic waveforms bilaterally throughout the lower extremities,   suggesting inflow disease (aorta-iliac disease). 2. Segment distal superficial femoral artery on the right with approximately   50-70 percent stenosis. ABIs done 3/27/19 that show a right JORDAN of 0.78 and a left JORDAN of 0.82. CTA abd and runoff    Check echo for EF, normal EF    CT of the abdomen  1. No thoracic aortic intramural hematoma, dissection, or stenosis.  No   aneurysm. 2. Infrarenal abdominal aortic aneurysm measuring 3.8 cm in maximal   dimension.  Follow-up recommendations as below.  Mural thrombus and aortic   stenosis is also noted as above. 3. Hepatic steatosis. 4. Small caliber iliac and common femoral arteries without moderate or severe   stenosis.    5. There are several small pulmonary nodules, the largest of which measuring   3 mm in size.  Follow-up recommendations as below.       RECOMMENDATIONS:   3.8 cm infrarenal abdominal aortic aneurysm. Recommend follow-up every 2   years. - DVT: in past in LLE ? Etiology    Mortality from the morbid obesity is very high: Body mass index is 35.41 kg/m². -We discussed in detail the management of peripheral vascular disease and abdominal aortic aneurysm please note above that the aneurysm is not to a size where it needs any treatment I will manage her conservatively with the increase in the dose of Pletal for now and if she continues to be symptomatic then we will might have to stent the right common iliac artery however at present we will continue present medical therapy  There is some degree of stenosis in the aorta as well which is giving gradient however given that we will manage medically as well for further assessment    Worthy Nereyda HEARN    [facility] [place]    Please note this report has been partially produced using speech recognition software and may contain errors related to that system including errors in grammar, punctuation, and spelling, as well as words and phrases that may be inappropriate. If there are any questions or concerns please feel free to contact the dictating provider for clarification.

## 2022-06-20 ENCOUNTER — TELEPHONE (OUTPATIENT)
Dept: CARDIOLOGY CLINIC | Age: 67
End: 2022-06-20

## 2022-06-20 NOTE — TELEPHONE ENCOUNTER
Patioent called since Dr Vickey Park increased her Pletal she feels very dizzy and makes her feel very nausea

## 2022-09-20 RX ORDER — CILOSTAZOL 50 MG/1
50 TABLET ORAL 2 TIMES DAILY
Qty: 60 TABLET | Refills: 3 | Status: SHIPPED | OUTPATIENT
Start: 2022-09-20

## 2022-11-11 ENCOUNTER — TELEPHONE (OUTPATIENT)
Dept: CARDIOLOGY CLINIC | Age: 67
End: 2022-11-11

## 2022-11-11 NOTE — TELEPHONE ENCOUNTER
Do you want her on the Cilostazol along with the Plavix? I can manage both meds but I would like to make sure this should go hand and hand? Please Advise.

## 2023-01-16 RX ORDER — CILOSTAZOL 50 MG/1
TABLET ORAL
Qty: 60 TABLET | Refills: 3 | OUTPATIENT
Start: 2023-01-16

## 2023-02-10 ENCOUNTER — HOSPITAL ENCOUNTER (OUTPATIENT)
Age: 68
Discharge: HOME | End: 2023-02-10
Payer: MEDICARE

## 2023-02-10 ENCOUNTER — OFFICE VISIT (OUTPATIENT)
Dept: CARDIOLOGY CLINIC | Age: 68
End: 2023-02-10

## 2023-02-10 VITALS
WEIGHT: 254 LBS | BODY MASS INDEX: 34.4 KG/M2 | DIASTOLIC BLOOD PRESSURE: 60 MMHG | SYSTOLIC BLOOD PRESSURE: 122 MMHG | HEIGHT: 72 IN | HEART RATE: 68 BPM

## 2023-02-10 DIAGNOSIS — I71.43 INFRARENAL ABDOMINAL AORTIC ANEURYSM (AAA) WITHOUT RUPTURE: ICD-10-CM

## 2023-02-10 DIAGNOSIS — I73.9 PVD (PERIPHERAL VASCULAR DISEASE) (HCC): ICD-10-CM

## 2023-02-10 DIAGNOSIS — I10 PRIMARY HYPERTENSION: ICD-10-CM

## 2023-02-10 DIAGNOSIS — I73.9 PVD (PERIPHERAL VASCULAR DISEASE) (HCC): Primary | ICD-10-CM

## 2023-02-10 DIAGNOSIS — E78.5 HYPERLIPIDEMIA, UNSPECIFIED HYPERLIPIDEMIA TYPE: ICD-10-CM

## 2023-02-10 LAB
CHOLEST SERPL-MCNC: 187 MG/DL
HDLC SERPL-MCNC: 51 MG/DL
LDLC SERPL CALC-MCNC: 109 MG/DL
TRIGL SERPL-MCNC: 136 MG/DL

## 2023-02-10 PROCEDURE — 36415 COLL VENOUS BLD VENIPUNCTURE: CPT

## 2023-02-10 PROCEDURE — 80061 LIPID PANEL: CPT

## 2023-02-10 RX ORDER — EZETIMIBE 10 MG/1
10 TABLET ORAL DAILY
Qty: 90 TABLET | Refills: 1 | Status: SHIPPED | OUTPATIENT
Start: 2023-02-10

## 2023-02-10 RX ORDER — MELOXICAM 15 MG/1
TABLET ORAL DAILY
COMMUNITY

## 2023-02-10 RX ORDER — CILOSTAZOL 50 MG/1
50 TABLET ORAL 2 TIMES DAILY
Qty: 60 TABLET | Refills: 5 | Status: SHIPPED | OUTPATIENT
Start: 2023-02-10

## 2023-02-10 RX ORDER — CITALOPRAM 20 MG/1
20 TABLET ORAL DAILY
COMMUNITY

## 2023-02-10 ASSESSMENT — ENCOUNTER SYMPTOMS
ORTHOPNEA: 0
SHORTNESS OF BREATH: 0

## 2023-02-10 NOTE — PATIENT INSTRUCTIONS
**It is YOUR responsibilty to bring medication bottles and/or updated medication list to [address] [place] [address]. This will allow us to better serve you and all your healthcare needs**    Thank you for allowing us to care for you today! We want to ensure we can follow your treatment plan and we strive to give you the best outcomes and experience possible. If you ever have a life threatening emergency and call 911 - for an ambulance (EMS)   Our providers can only care for you at:   [facility] or [facility]. Even if you have someone take you or you drive yourself we can only care for you in a [address] [facility] facility. Our providers are not setup at the other healthcare locations! Please be informed that if you contact our office outside of normal business hours the physician on call cannot help with any scheduling or rescheduling issues, procedure instruction questions or any type of medication issue. We advise you for any urgent/emergency that you go to the nearest emergency room! PLEASE CALL OUR OFFICE DURING NORMAL BUSINESS HOURS    Monday - Friday   8 am to 5 pm    [facility] [address]: [phone]    [place]:  Kai Diaz [address] Laboratory Locations - No appointment necessary. Sites open Monday to Friday. Call your preferred location for test preparation, business   hours and other information you need. [employer] [facility] [employer]. [address]  [facility], [address] [facility]  Phone: [phone] Tayla Oconnell  [address] [facility]  [address] [place]la Oconnell, [address] [facility]  Phone: [phone]

## 2023-02-10 NOTE — PROGRESS NOTES
2/10/2023  Primary cardiologist: Dr. Ly Wright    CC:   Collins Gerber  is an established 78 y.o.  female here for a 3 month follow up on PVD      SUBJECTIVE/OBJECTIVE:  Collins Gerber is a 78 y.o. female with a history of vascular disease, PTA RLE, abdominal aortic aneurysm DVT of left lower extremity, hypertension, hyperlipidemia, hypothyroid and pulmonary nodules. Peripheral angiogram June 2022 shows severe PAD. The abdominal aorta is atherosclerotic its aneurysmal in the middle there appears to be a stenosis infrarenally and a 20 mm gradient was noticed. CTA of abdomen shows 3.8 cm infrarenal abdominal aortic aneurysm    HPI :   Ivon JENSEN reports overall she is feeling fairly well. She notes arthritis pain to her bilateral needs. She also reports claudications with walking long distances. Needs to stop and rest for relief. Was not able to tolerate higher dose of Pletal secondary to epistaxis. Review of Systems   Constitutional: Negative for diaphoresis and malaise/fatigue. Cardiovascular:  Positive for claudication. Negative for chest pain, dyspnea on exertion, irregular heartbeat, leg swelling, near-syncope, orthopnea, palpitations and paroxysmal nocturnal dyspnea. Respiratory:  Negative for shortness of breath. Musculoskeletal:  Positive for joint pain. Neurological:  Negative for dizziness and light-headedness. Vitals:    02/10/23 1153   BP: 122/60   Site: Left Upper Arm   Position: Sitting   Cuff Size: Large Adult   Pulse: 68   Weight: 254 lb (115.2 kg)   Height: 6' 1\" (1.854 m)     No flowsheet data found. Wt Readings from Last 3 Encounters:   02/10/23 254 lb (115.2 kg)   06/13/22 268 lb 6.4 oz (121.7 kg)   05/31/22 274 lb (124.3 kg)     Body mass index is 33.51 kg/m². Physical Exam  Vitals reviewed. Constitutional:       Appearance: Normal appearance. HENT:      Head: Normocephalic and atraumatic.       Mouth/Throat:      Mouth: Mucous membranes are moist.   Eyes:      Extraocular Movements: Extraocular movements intact. Pupils: Pupils are equal, round, and reactive to light. Neck:      Vascular: No carotid bruit. Cardiovascular:      Rate and Rhythm: Normal rate and regular rhythm. Pulses:           Dorsalis pedis pulses are 1+ on the right side and 1+ on the left side. Posterior tibial pulses are 1+ on the right side and 1+ on the left side. Pulmonary:      Effort: Pulmonary effort is normal.      Breath sounds: Normal breath sounds. No rales. Chest:      Chest wall: No tenderness. Abdominal:      General: There is no distension. Palpations: Abdomen is soft. Tenderness: There is no abdominal tenderness. Musculoskeletal:      Cervical back: No tenderness. Right lower leg: No edema. Left lower leg: No edema. Skin:     General: Skin is warm and dry. Capillary Refill: Capillary refill takes less than 2 seconds. Comments: Varicose  veins    Neurological:      General: No focal deficit present. Mental Status: She is alert and oriented to person, place, and time. Psychiatric:         Mood and Affect: Mood normal.         Behavior: Behavior normal.              Current Outpatient Medications   Medication Sig Dispense Refill    doxyLAMINE succinate (GNP SLEEP AID) 25 MG tablet Take by mouth nightly      meloxicam (MOBIC) 15 MG tablet Take by mouth daily      cimetidine (CIMETIDINE 200) 200 MG tablet Take 200 mg by mouth 4 times daily      citalopram (CELEXA) 20 MG tablet Take 20 mg by mouth daily      cilostazol (PLETAL) 50 MG tablet Take 1 tablet by mouth 2 times daily 60 tablet 3    busPIRone (BUSPAR) 10 MG tablet Take 1 tablet by mouth 3 times daily as needed for Anxiety. oxyCODONE-acetaminophen (PERCOCET) 7.5-325 MG per tablet Take 7.5 tablets by mouth every 8 hours as needed.       albuterol sulfate  (90 Base) MCG/ACT inhaler Inhale 1 puff into the lungs every 6 hours as needed      rosuvastatin (CRESTOR) 40 MG tablet Take 40 mg by mouth daily      lisinopril (PRINIVIL;ZESTRIL) 10 MG tablet Take 10 mg by mouth daily      levothyroxine (SYNTHROID) 50 MCG tablet Take 50 mcg by mouth daily      clopidogrel (PLAVIX) 75 MG tablet Take 1 tablet by mouth daily 90 tablet 3    ibuprofen (ADVIL;MOTRIN) 600 MG tablet Take 600 mg by mouth every 6 hours as needed for Pain (Patient not taking: Reported on 2/10/2023)      cetirizine (ZYRTEC) 10 MG tablet Take 10 mg by mouth daily (Patient not taking: Reported on 2/10/2023)      LORazepam (ATIVAN) 1 MG tablet Take 0.5 mg by mouth as needed. (Patient not taking: Reported on 2/10/2023)      dicyclomine (BENTYL) 20 MG tablet Take 20 mg by mouth every 6 hours (Patient not taking: Reported on 2/10/2023)       No current facility-administered medications for this visit. All pertinent data reviewed and discussed with patient  Peripheral angiogram June 2022  The abdominal aorta is atherosclerotic its aneurysmal in the middle there appears to be a stenosis infrarenally and a 20 mm gradient was noticed     Right common iliac artery has ostial stenosis which is about 70%  The right external iliac artery and right common femoral artery is patent  The right SFA has some diffuse disease distally there is a stent which is just approaching the popliteal and the appropriate there appears to be 50% stenosis and infrapopliteal there is three-vessel runoff without any significant stenosis     Left common iliac arteries patent left external leg arteries patent left common femoral artery is patent the left SFA is patent the left popliteal has about 80 to 90% stenosis in its mid segment infrapopliteal there is three-vessel runoff        ASSESSMENT/PLAN:    Peripheral vascular disease  Has severe peripheral vascular disease. Has mild claudication symptoms with walking long distance. However states symptoms are unchanged his previous visit. We will continue with Pletal 50 mg twice daily.   Unfortunately is not able to tolerate higher dose. I advised her to start a walking program of 20 minutes 5 days/week as well. Infrarenal abdominal aortic aneurysm  CT of abdomen demonstrated infrarenal abdominal arctic aneurysm measuring 3.8 cm in maximal dimension. Aneurysm status size for intervention at this time. We will continue with ongoing surveillance with recommended annual CT or MRI  We will plan for CT at next office visit  Hypertension  Well-controlled/stable with use of lisinopril. No changes will be made. She is tolerating medication without side effects. Hyperlipidemia  Lipids noted from care everywhere from November 2022  Total cholesterol 203, HDL 51,   We will add Zetia 10 mg once daily to assist with lowering of the LDL as she is already on high intensity statin   if not at goal of LDL less than 100 we will add PCSK9  Advised a low-fat low-cholesterol diet  Plan to recheck lipids in 8 to 12 weeks further adjustments of medications can be made at that time    Tests ordered:  lipids   Follow-up  3 mo      Signed:  JENY Ryder CNP, 2/10/2023, 12:14 PM    An electronic signature was used to authenticate this note. Please note this report has been partially produced using speech recognition software and may contain errors related to that system including errors in grammar, punctuation, and spelling, as well as words and phrases that may be inappropriate. If there are any questions or concerns please feel free to contact the dictating provider for clarification.

## 2023-02-13 ENCOUNTER — TELEPHONE (OUTPATIENT)
Dept: CARDIOLOGY CLINIC | Age: 68
End: 2023-02-13

## 2023-02-13 NOTE — TELEPHONE ENCOUNTER
Called and left VM for pt to call back for lipids- LDL is high: goal for 70- the zetia was just started - will continue present meds and recheck in 12 weeks

## 2023-02-14 ENCOUNTER — TELEPHONE (OUTPATIENT)
Dept: CARDIOLOGY CLINIC | Age: 68
End: 2023-02-14

## 2023-02-14 NOTE — TELEPHONE ENCOUNTER
Second attempt to call pt about lipid results. VM was left for pt to call back.  - LDL is high: goal for 70- the zetia was just started - will continue present meds and recheck in 12 weeks

## 2023-02-15 ENCOUNTER — TELEPHONE (OUTPATIENT)
Dept: CARDIOLOGY CLINIC | Age: 68
End: 2023-02-15

## 2023-02-15 NOTE — TELEPHONE ENCOUNTER
Third attempt to get a hold of pt. Called and left VM for pt to call back for lipid results.  - LDL is high: goal for 70- the zetia was just started - will continue present meds and recheck in 12 weeks

## 2023-05-10 ENCOUNTER — OFFICE VISIT (OUTPATIENT)
Dept: CARDIOLOGY CLINIC | Age: 68
End: 2023-05-10
Payer: MEDICARE

## 2023-05-10 VITALS
BODY MASS INDEX: 34.87 KG/M2 | SYSTOLIC BLOOD PRESSURE: 118 MMHG | HEART RATE: 77 BPM | OXYGEN SATURATION: 97 % | WEIGHT: 257.44 LBS | DIASTOLIC BLOOD PRESSURE: 70 MMHG | HEIGHT: 72 IN | RESPIRATION RATE: 16 BRPM

## 2023-05-10 DIAGNOSIS — I73.9 PVD (PERIPHERAL VASCULAR DISEASE) (HCC): Primary | ICD-10-CM

## 2023-05-10 PROCEDURE — G8400 PT W/DXA NO RESULTS DOC: HCPCS | Performed by: INTERNAL MEDICINE

## 2023-05-10 PROCEDURE — G8427 DOCREV CUR MEDS BY ELIG CLIN: HCPCS | Performed by: INTERNAL MEDICINE

## 2023-05-10 PROCEDURE — 99214 OFFICE O/P EST MOD 30 MIN: CPT | Performed by: INTERNAL MEDICINE

## 2023-05-10 PROCEDURE — 1036F TOBACCO NON-USER: CPT | Performed by: INTERNAL MEDICINE

## 2023-05-10 PROCEDURE — 1123F ACP DISCUSS/DSCN MKR DOCD: CPT | Performed by: INTERNAL MEDICINE

## 2023-05-10 PROCEDURE — 1090F PRES/ABSN URINE INCON ASSESS: CPT | Performed by: INTERNAL MEDICINE

## 2023-05-10 PROCEDURE — 3017F COLORECTAL CA SCREEN DOC REV: CPT | Performed by: INTERNAL MEDICINE

## 2023-05-10 PROCEDURE — G8417 CALC BMI ABV UP PARAM F/U: HCPCS | Performed by: INTERNAL MEDICINE

## 2023-05-10 PROCEDURE — 3078F DIAST BP <80 MM HG: CPT | Performed by: INTERNAL MEDICINE

## 2023-05-10 PROCEDURE — 3074F SYST BP LT 130 MM HG: CPT | Performed by: INTERNAL MEDICINE

## 2023-05-10 RX ORDER — CILOSTAZOL 50 MG/1
50 TABLET ORAL 2 TIMES DAILY
Qty: 60 TABLET | Refills: 5 | Status: SHIPPED | OUTPATIENT
Start: 2023-05-10

## 2023-05-10 RX ORDER — EZETIMIBE 10 MG/1
10 TABLET ORAL DAILY
Qty: 90 TABLET | Refills: 1 | Status: SHIPPED | OUTPATIENT
Start: 2023-05-10

## 2023-05-10 RX ORDER — CLOPIDOGREL BISULFATE 75 MG/1
75 TABLET ORAL DAILY
Qty: 90 TABLET | Refills: 3 | Status: SHIPPED | OUTPATIENT
Start: 2023-05-10

## 2023-05-10 NOTE — PROGRESS NOTES
has been partially produced using speech recognition software and may contain errors related to that system including errors in grammar, punctuation, and spelling, as well as words and phrases that may be inappropriate. If there are any questions or concerns please feel free to contact the dictating provider for clarification.

## 2023-11-16 ENCOUNTER — OFFICE VISIT (OUTPATIENT)
Dept: CARDIOLOGY CLINIC | Age: 68
End: 2023-11-16

## 2023-11-16 VITALS
DIASTOLIC BLOOD PRESSURE: 78 MMHG | SYSTOLIC BLOOD PRESSURE: 130 MMHG | WEIGHT: 267.2 LBS | OXYGEN SATURATION: 96 % | HEART RATE: 81 BPM | BODY MASS INDEX: 36.19 KG/M2 | HEIGHT: 72 IN

## 2023-11-16 DIAGNOSIS — I10 PRIMARY HYPERTENSION: ICD-10-CM

## 2023-11-16 DIAGNOSIS — I73.9 PVD (PERIPHERAL VASCULAR DISEASE) (HCC): Primary | ICD-10-CM

## 2023-11-16 ASSESSMENT — ENCOUNTER SYMPTOMS
ORTHOPNEA: 0
SHORTNESS OF BREATH: 0

## 2023-11-16 NOTE — PROGRESS NOTES
oxyCODONE-acetaminophen (PERCOCET) 7.5-325 MG per tablet Take 7.5 tablets by mouth every 8 hours as needed. albuterol sulfate  (90 Base) MCG/ACT inhaler Inhale 1 puff into the lungs every 6 hours as needed      rosuvastatin (CRESTOR) 40 MG tablet Take 1 tablet by mouth daily      lisinopril (PRINIVIL;ZESTRIL) 10 MG tablet Take 1 tablet by mouth daily      levothyroxine (SYNTHROID) 50 MCG tablet Take 1 tablet by mouth daily      meloxicam (MOBIC) 15 MG tablet Take by mouth daily (Patient not taking: Reported on 11/16/2023)      busPIRone (BUSPAR) 10 MG tablet Take 1 tablet by mouth 3 times daily as needed for Anxiety. (Patient not taking: Reported on 11/16/2023)      LORazepam (ATIVAN) 1 MG tablet Take 0.5 tablets by mouth as needed. (Patient not taking: Reported on 11/16/2023)      dicyclomine (BENTYL) 20 MG tablet Take 20 mg by mouth every 6 hours (Patient not taking: Reported on 2/10/2023)       No current facility-administered medications for this visit. All pertinent data reviewed and discussed with patient       ASSESSMENT/PLAN:    Peripheral vascular disease  History of PTA in the past.  Has moderate disease of the iliacs. Denies claudication symptoms. We will continue with Pletal 50 mg twice daily. I have encouraged her to continue with her self active with walking on a daily basis. Continue statin therapy with Crestor    Hypertension  Pressures controlled with use of lisinopril-no changes will be made. Advised a low-sodium diet      Hyperlipidemia  To have lipids done with her PCP. Strive for an LDL of less than 100. Continue with Crestor and Zetia    Infrarenal abdominal aortic aneurysm  CT 05/2022: infrarenal abdominal aortic aneurysm measuring 3.8 cm.   Plan to check CT at next office visit    Hypothyroid  On levothyroxine     Tests ordered:  none   Follow-up  6 mo     Signed:  JENY Solorio CNP, 11/16/2023, 4:30 PM    An electronic signature was used to authenticate

## 2023-11-16 NOTE — PATIENT INSTRUCTIONS
Thank you for allowing us to care for you today! We want to ensure we can follow your treatment plan and we strive to give you the best outcomes and experience possible. If you ever have a life threatening emergency and call 911 - for an ambulance (EMS)   Our providers can only care for you at:   [facility] or [facility]. Even if you have someone take you or you drive yourself we can only care for you in a [facility] Our providers are not setup at the other healthcare locations! **It is YOUR responsibilty to bring medication bottles and/or updated medication list to [address] [place] [address]. This will allow us to better serve you and all your healthcare needs**  [facility] Locations - No appointment necessary. Sites open Monday to Friday. Call your preferred location for test preparation, business   hours and other information you need. [employer] accepts 's. [address] [place] [address]. [address] [place]   Phone: [phone]     We are committed to providing you the best care possible. If you receive a survey after visiting one of our offices, please take time to share your experience concerning your physician office visit. These surveys are confidential and no health information about you is shared. We are eager to improve for you and we are counting on your feedback to help make that happen.

## 2023-12-28 ENCOUNTER — TELEPHONE (OUTPATIENT)
Dept: CARDIOLOGY CLINIC | Age: 68
End: 2023-12-28

## 2023-12-28 NOTE — TELEPHONE ENCOUNTER
Patient called she is having a issue with her   Left leg , very painful unable to bend it, she has a   History DVT as well  I did put her on Missy doe Friday   As well please advise

## 2024-01-29 RX ORDER — CILOSTAZOL 50 MG/1
50 TABLET ORAL 2 TIMES DAILY
Qty: 60 TABLET | Refills: 5 | Status: SHIPPED | OUTPATIENT
Start: 2024-01-29

## 2024-03-21 RX ORDER — EZETIMIBE 10 MG/1
10 TABLET ORAL DAILY
Qty: 90 TABLET | Refills: 3 | Status: SHIPPED | OUTPATIENT
Start: 2024-03-21

## 2024-04-16 ENCOUNTER — TELEPHONE (OUTPATIENT)
Dept: CARDIOLOGY CLINIC | Age: 69
End: 2024-04-16

## 2024-04-16 NOTE — TELEPHONE ENCOUNTER
Cardiologist: Dr. Mcpherson  Surgeon: Dr. Nuñez  Surgery: Left total knee arthroplasty  Surgery/Procedure should be done in the hospital setting    _____ yes    _____  no    Anesthesia: General  Date: 5/13/2024  Fax# [phone]  # [phone]    Last OV 11/16/2023 w/[facility]    Peripheral vascular disease  History of PTA in the past.  Has moderate disease of the iliacs.  Denies claudication symptoms.  We will continue with Pletal 50 mg twice daily.  I have encouraged her to continue with her self active with walking on a daily basis.  Continue statin therapy with Crestor     Hypertension  Pressures controlled with use of lisinopril-no changes will be made.  Advised a low-sodium diet        Hyperlipidemia  To have lipids done with her PCP.  Strive for an LDL of less than 100.  Continue with Crestor and Zetia     Infrarenal abdominal aortic aneurysm  CT 05/2022: infrarenal abdominal aortic aneurysm measuring 3.8 cm.  Plan to check CT at next office visit     Hypothyroid  On levothyroxine         Last EKG- 11/16/2023      NM- 6/3/2022  Normal tracer uptake in all segments of myocardium on stress ans rest   images. Normal Lexiscan nuclear scintigraphic study suggestive of normal   myocardial perfusion. Gated images demonstrate normal left ventricular   systolic function with EF of 60 %.      Echo- 5/26/2022   Left ventricular systolic function is normal with an ejection fraction of   55-60%.   Grade I diastolic dysfunction.   Sclerotic, but non-stenotic aortic valve.   Mitral annular calcification is present.   Normal pulmonary artery pressure with a RVSP of 21 mmHg.   No evidence of pericardial effusion.      Cath-Extremity   Angiography, Abdominal Aortogram - 5/31/2022          Pletal    Plavix

## 2024-05-07 ENCOUNTER — TELEPHONE (OUTPATIENT)
Dept: CARDIOLOGY CLINIC | Age: 69
End: 2024-05-07

## 2024-10-02 RX ORDER — CILOSTAZOL 50 MG/1
50 TABLET ORAL 2 TIMES DAILY
Qty: 180 TABLET | Refills: 1 | Status: SHIPPED | OUTPATIENT
Start: 2024-10-02

## 2024-10-15 ENCOUNTER — OFFICE VISIT (OUTPATIENT)
Dept: CARDIOLOGY CLINIC | Age: 69
End: 2024-10-15
Payer: MEDICARE

## 2024-10-15 ENCOUNTER — TELEPHONE (OUTPATIENT)
Dept: CARDIOLOGY CLINIC | Age: 69
End: 2024-10-15

## 2024-10-15 VITALS
HEART RATE: 77 BPM | DIASTOLIC BLOOD PRESSURE: 64 MMHG | WEIGHT: 271 LBS | HEIGHT: 72 IN | SYSTOLIC BLOOD PRESSURE: 122 MMHG | BODY MASS INDEX: 36.7 KG/M2 | OXYGEN SATURATION: 97 %

## 2024-10-15 DIAGNOSIS — I10 PRIMARY HYPERTENSION: Primary | ICD-10-CM

## 2024-10-15 DIAGNOSIS — E78.5 HYPERLIPIDEMIA, UNSPECIFIED HYPERLIPIDEMIA TYPE: ICD-10-CM

## 2024-10-15 DIAGNOSIS — I71.43 INFRARENAL ABDOMINAL AORTIC ANEURYSM (AAA) WITHOUT RUPTURE (HCC): ICD-10-CM

## 2024-10-15 DIAGNOSIS — I73.9 PVD (PERIPHERAL VASCULAR DISEASE) (HCC): ICD-10-CM

## 2024-10-15 PROCEDURE — 3078F DIAST BP <80 MM HG: CPT | Performed by: NURSE PRACTITIONER

## 2024-10-15 PROCEDURE — 99214 OFFICE O/P EST MOD 30 MIN: CPT | Performed by: NURSE PRACTITIONER

## 2024-10-15 PROCEDURE — 93000 ELECTROCARDIOGRAM COMPLETE: CPT | Performed by: NURSE PRACTITIONER

## 2024-10-15 PROCEDURE — 1123F ACP DISCUSS/DSCN MKR DOCD: CPT | Performed by: NURSE PRACTITIONER

## 2024-10-15 PROCEDURE — 3074F SYST BP LT 130 MM HG: CPT | Performed by: NURSE PRACTITIONER

## 2024-10-15 ASSESSMENT — ENCOUNTER SYMPTOMS
ORTHOPNEA: 0
SHORTNESS OF BREATH: 0

## 2024-10-15 NOTE — PROGRESS NOTES
10/15/2024  Primary cardiologist: Dr. Mcpherson    CC:   Sapna JENSEN  is an established 69 y.o.  female here for a follow up on PAD      SUBJECTIVE/OBJECTIVE:  Sapna JENSEN is a 69 y.o. female with a history of vascular disease, PTA of RLE, Infrarenal abdominal aortic aneurysm, DVT of left lower extremity, hypertension, hyperlipidemia, hypothyroid and pulmonary nodules.      HPI :   Sapna JENSEN being seen today to follow-up on her vascular health.  Ngoc states she is feeling pretty good.  She does note pain to her lower legs with walking.  Sometimes needs to stop and rest due to the pain.     Review of Systems   Constitutional: Negative for diaphoresis and malaise/fatigue.   Cardiovascular:  Positive for claudication. Negative for chest pain, dyspnea on exertion, irregular heartbeat, leg swelling, near-syncope, orthopnea, palpitations and paroxysmal nocturnal dyspnea.   Respiratory:  Negative for shortness of breath.    Neurological:  Negative for dizziness and light-headedness.       Vitals:    10/15/24 1351   BP: 122/64   Site: Left Upper Arm   Position: Sitting   Cuff Size: Large Adult   Pulse: 77   SpO2: 97%   Weight: 122.9 kg (271 lb)   Height: 1.854 m (6' 1\")     Wt Readings from Last 3 Encounters:   10/15/24 122.9 kg (271 lb)   11/16/23 121.2 kg (267 lb 3.2 oz)   05/10/23 116.8 kg (257 lb 7 oz)      Body mass index is 35.75 kg/m².     Physical Exam  Vitals reviewed.   Eyes:      Pupils: Pupils are equal, round, and reactive to light.   Neck:      Vascular: No carotid bruit.   Cardiovascular:      Rate and Rhythm: Normal rate and regular rhythm.      Pulses: Normal pulses.   Pulmonary:      Effort: Pulmonary effort is normal.      Breath sounds: Normal breath sounds. No rales.   Chest:      Chest wall: No tenderness.   Musculoskeletal:      Cervical back: No tenderness.      Right lower leg: No edema.      Left lower leg: No edema.   Skin:     General: Skin is warm and dry.      Capillary Refill: Capillary refill takes

## 2024-10-15 NOTE — TELEPHONE ENCOUNTER
Left message with patient to provide CTA Abdomen W/ Contrast appointment info:  Patient is scheduled at [facility] on 11/18/24, with an arrival time of 9:00 am and a start time of 9:30 am.     Patient advised to be NPO 4 hours prior.    Patient should bring photo id and insurance card.  Patient was advised that should the appointment need to be rescheduled, to contact Central Scheduling at [phone].

## 2024-11-18 ENCOUNTER — HOSPITAL ENCOUNTER (OUTPATIENT)
Dept: CT IMAGING | Age: 69
Discharge: HOME | End: 2024-11-18
Payer: MEDICARE

## 2024-11-18 DIAGNOSIS — I71.43 INFRARENAL ABDOMINAL AORTIC ANEURYSM (AAA) WITHOUT RUPTURE (HCC): ICD-10-CM

## 2024-11-18 PROCEDURE — 2580000003 HC RX 258: Performed by: NURSE PRACTITIONER

## 2024-11-18 PROCEDURE — 6360000004 HC RX CONTRAST MEDICATION: Performed by: NURSE PRACTITIONER

## 2024-11-18 PROCEDURE — 74175 CTA ABDOMEN W/CONTRAST: CPT

## 2024-11-18 RX ORDER — SODIUM CHLORIDE 9 MG/ML
10 INJECTION, SOLUTION INTRAMUSCULAR; INTRAVENOUS; SUBCUTANEOUS PRN
Status: DISCONTINUED | OUTPATIENT
Start: 2024-11-18 | End: 2024-11-19 | Stop reason: HOSPADM

## 2024-11-18 RX ORDER — IOPAMIDOL 755 MG/ML
75 INJECTION, SOLUTION INTRAVASCULAR
Status: COMPLETED | OUTPATIENT
Start: 2024-11-18 | End: 2024-11-18

## 2024-11-18 RX ADMIN — SODIUM CHLORIDE, PRESERVATIVE FREE 10 ML: 5 INJECTION INTRAVENOUS at 09:16

## 2024-11-18 RX ADMIN — IOPAMIDOL 75 ML: 755 INJECTION, SOLUTION INTRAVENOUS at 09:18

## 2025-03-25 RX ORDER — EZETIMIBE 10 MG/1
10 TABLET ORAL DAILY
Qty: 90 TABLET | Refills: 3 | Status: SHIPPED | OUTPATIENT
Start: 2025-03-25

## 2025-04-22 ENCOUNTER — OFFICE VISIT (OUTPATIENT)
Dept: CARDIOLOGY CLINIC | Age: 70
End: 2025-04-22
Payer: MEDICARE

## 2025-04-22 VITALS
WEIGHT: 273 LBS | BODY MASS INDEX: 36.98 KG/M2 | HEIGHT: 72 IN | HEART RATE: 72 BPM | SYSTOLIC BLOOD PRESSURE: 108 MMHG | DIASTOLIC BLOOD PRESSURE: 70 MMHG

## 2025-04-22 DIAGNOSIS — I10 PRIMARY HYPERTENSION: ICD-10-CM

## 2025-04-22 DIAGNOSIS — I73.9 PVD (PERIPHERAL VASCULAR DISEASE): ICD-10-CM

## 2025-04-22 DIAGNOSIS — I71.43 INFRARENAL ABDOMINAL AORTIC ANEURYSM (AAA) WITHOUT RUPTURE: Primary | ICD-10-CM

## 2025-04-22 DIAGNOSIS — E78.5 HYPERLIPIDEMIA, UNSPECIFIED HYPERLIPIDEMIA TYPE: ICD-10-CM

## 2025-04-22 PROCEDURE — 1160F RVW MEDS BY RX/DR IN RCRD: CPT | Performed by: NURSE PRACTITIONER

## 2025-04-22 PROCEDURE — 3078F DIAST BP <80 MM HG: CPT | Performed by: NURSE PRACTITIONER

## 2025-04-22 PROCEDURE — 1123F ACP DISCUSS/DSCN MKR DOCD: CPT | Performed by: NURSE PRACTITIONER

## 2025-04-22 PROCEDURE — 99214 OFFICE O/P EST MOD 30 MIN: CPT | Performed by: NURSE PRACTITIONER

## 2025-04-22 PROCEDURE — 3074F SYST BP LT 130 MM HG: CPT | Performed by: NURSE PRACTITIONER

## 2025-04-22 PROCEDURE — 1159F MED LIST DOCD IN RCRD: CPT | Performed by: NURSE PRACTITIONER

## 2025-04-22 RX ORDER — EZETIMIBE 10 MG/1
10 TABLET ORAL DAILY
Qty: 90 TABLET | Refills: 3 | Status: SHIPPED | OUTPATIENT
Start: 2025-04-22

## 2025-04-22 RX ORDER — CILOSTAZOL 50 MG/1
50 TABLET ORAL 2 TIMES DAILY
Qty: 180 TABLET | Refills: 1 | Status: SHIPPED | OUTPATIENT
Start: 2025-04-22

## 2025-04-22 RX ORDER — CLOPIDOGREL BISULFATE 75 MG/1
75 TABLET ORAL DAILY
Qty: 90 TABLET | Refills: 3 | Status: SHIPPED | OUTPATIENT
Start: 2025-04-22

## 2025-04-22 ASSESSMENT — ENCOUNTER SYMPTOMS
ORTHOPNEA: 0
SHORTNESS OF BREATH: 0

## 2025-04-22 NOTE — PATIENT INSTRUCTIONS
Thank you for allowing us to care for you today!   We want to ensure we can follow your treatment plan and we strive to give you the best outcomes and experience possible.   If you ever have a life threatening emergency and call 911 - for an ambulance (EMS)  REMEMBER  Our providers can only care for you at:   [facility] or [facility]   Even if you have someone take you or you drive yourself we can only care for you in a [facility] facility. Our providers are not setup at the other healthcare locations!    PLEASE CALL OUR OFFICE DURING NORMAL BUSINESS HOURS  Monday through Friday 8 am to 5 pm  AFTER HOURS the physician on-call cannot help with scheduling, rescheduling, procedure instruction questions or any type of medication need or issue.  [facility] P:[phone] - [facility] P:[phone] - [facility] P:[phone]      If you receive a survey:  We would appreciate you taking the time to share your experience concerning your provider visit in the office.    These surveys are confidential!  We are eager to improve and are counting on you to share your feedback so we can ensure you get the best care possible.

## 2025-04-22 NOTE — PROGRESS NOTES
CLINICAL STAFF DOCUMENTATION    Chanelle Hernandez, PAM     Sapna Neumann  1955  [id number]    Have you had any Chest Pain recently? - No  Have you had any Shortness of Breath - Yes nothing new or worsened   Have you had any dizziness - No  Have you had any palpitations recently? - No  Do you have any edema - swelling in No    When did you have your last labs drawn 4/25  What doctor ordered eva  Do we have the labs in their chart Yes  Do you need any prescriptions refilled? - Yes  Do you have a surgery or procedure scheduled in the near future - No  Do use tobacco products? - No  Do you drink alcohol? - No  Do you use any illicit drugs? - No  Caffeine? - No    Check medication list thoroughly!!! AND RECONCILE OUTSIDE MEDICATIONS  If dose has changed change the entire order not just the MG  BE SURE TO ASK PATIENT IF THEY NEED MEDICATION REFILLS  Verify Pharmacy and update if incorrect    Add to every patient's \"wrap up\" the following dot phrase AFTERVISITCARDIOHEARTHOUSE and ensure we explain this to our patients